# Patient Record
Sex: MALE | Race: WHITE
[De-identification: names, ages, dates, MRNs, and addresses within clinical notes are randomized per-mention and may not be internally consistent; named-entity substitution may affect disease eponyms.]

---

## 2023-07-14 ENCOUNTER — HOSPITAL ENCOUNTER (EMERGENCY)
Dept: HOSPITAL 4 - SED | Age: 68
Discharge: HOME | End: 2023-07-14
Payer: COMMERCIAL

## 2023-07-14 VITALS
HEART RATE: 56 BPM | RESPIRATION RATE: 18 BRPM | SYSTOLIC BLOOD PRESSURE: 166 MMHG | OXYGEN SATURATION: 97 % | TEMPERATURE: 98 F

## 2023-07-14 VITALS — WEIGHT: 237 LBS | BODY MASS INDEX: 33.18 KG/M2 | HEIGHT: 71 IN

## 2023-07-14 VITALS — HEART RATE: 49 BPM | OXYGEN SATURATION: 95 % | RESPIRATION RATE: 17 BRPM | SYSTOLIC BLOOD PRESSURE: 137 MMHG
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
## 2023-07-14 DIAGNOSIS — Z79.899: ICD-10-CM

## 2023-07-14 DIAGNOSIS — R00.1: ICD-10-CM
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
## 2023-07-14 DIAGNOSIS — S30.861A: Primary | ICD-10-CM
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
## 2023-07-14 LAB
ALBUMIN SERPL BCP-MCNC: 3.7 G/DL (ref 3.4–4.8)
ALT SERPL W P-5'-P-CCNC: 32 U/L (ref 12–78)
ANION GAP SERPL CALCULATED.3IONS-SCNC: 9 MMOL/L (ref 5–15)
AST SERPL W P-5'-P-CCNC: 21 U/L (ref 10–37)
BASOPHILS # BLD AUTO: 0 K/UL (ref 0–0.2)
BASOPHILS NFR BLD AUTO: 0.8 % (ref 0–2)
BILIRUB SERPL-MCNC: 0.7 MG/DL (ref 0–1)
BUN SERPL-MCNC: 29 MG/DL (ref 8–21)
CALCIUM SERPL-MCNC: 9.1 MG/DL (ref 8.4–11)
CHLORIDE SERPL-SCNC: 106 MMOL/L (ref 98–107)
CREAT SERPL-MCNC: 1.57 MG/DL (ref 0.55–1.3)
EOSINOPHIL # BLD AUTO: 0.1 K/UL (ref 0–0.4)
EOSINOPHIL NFR BLD AUTO: 2 % (ref 0–4)
ERYTHROCYTE [DISTWIDTH] IN BLOOD BY AUTOMATED COUNT: 14.2 % (ref 9–15)
GFR SERPL CREATININE-BSD FRML MDRD: 57 ML/MIN (ref 90–?)
GLUCOSE SERPL-MCNC: 97 MG/DL (ref 74–106)
HCT VFR BLD AUTO: 42.4 % (ref 36–54)
HGB BLD-MCNC: 14.2 G/DL (ref 14–18)
LYMPHOCYTES # BLD AUTO: 1.3 K/UL (ref 1–5.5)
LYMPHOCYTES NFR BLD AUTO: 26.9 % (ref 20.5–51.5)
MCH RBC QN AUTO: 32 PG (ref 27–31)
MCHC RBC AUTO-ENTMCNC: 34 % (ref 32–36)
MCV RBC AUTO: 94 FL (ref 79–98)
MONOCYTES # BLD MANUAL: 0.5 K/UL (ref 0–1)
MONOCYTES # BLD MANUAL: 11.2 % (ref 1.7–9.3)
NEUTROPHILS # BLD AUTO: 2.9 K/UL (ref 1.8–7.7)
NEUTROPHILS NFR BLD AUTO: 59.1 % (ref 40–70)
PLATELET # BLD AUTO: 191 K/UL (ref 130–430)
RBC # BLD AUTO: 4.49 MIL/UL (ref 4.2–6.2)
WBC # BLD AUTO: 4.8 K/UL (ref 4.8–10.8)

## 2023-07-14 NOTE — NUR
BIB PARAMEDICS C/O STUNG BY WASP AS PER PATIENT APPROX 1015H. PT WAS WATERING 
HIS PLANTS AND WAS STINGED MY MULTIPLE WASPS. DENIES SOB, WHEEZES, DIZZINESS. 
PT A/OX4, AMBULATORY. PT HOOKED TO MONITOR. SEEN AND EXAMINED BY DR BARBER ON 
BEDSIDE.

## 2023-07-14 NOTE — NUR
-------------------------------------------------------------------------------

           *** Note undone in EDM - 07/14/23 at 1054 by [id number] ***            

-------------------------------------------------------------------------------

BIB PARAMEDICS C/O STINGED BY WASP AS PER PATIENT APPROX 1015H. PT WAS WATERING 
HIS PL[place]S AND WAS STINGED MY MULTIPLE WASPS. DENIES SOB, WHEEZES, DIZZINESS. 
PT A/OX4, AMBULATORY. PT HOOKED TO MONITOR. SEEN AND EXAMINED BY DR BARBER ON 
BEDSIDE.

## 2023-07-14 NOTE — NUR
Placed in room 1  . Placed on cardiac monitor, blood pressure machine and pulse 
oximeter. To gown for exam. Side rails up.

Report given to TRAV MA.

## 2023-07-14 NOTE — NUR
Patient given written and verbal discharge instructions and verbalizes 
understanding.  GERONIMO BARBER MD discussed with patient the results and treatment 
provided. Patient in stable condition. ID arm band removed. IV catheter removed 
intact and dressing applied, no active bleeding.

Rx of  given. Patient educated on pain management and to follow up with PMD. 
Pain Scale 3/10. Opportunity for questions provided and answered. Medication 
side effect fact sheet provided.